# Patient Record
Sex: FEMALE | URBAN - METROPOLITAN AREA
[De-identification: names, ages, dates, MRNs, and addresses within clinical notes are randomized per-mention and may not be internally consistent; named-entity substitution may affect disease eponyms.]

---

## 2023-06-12 ENCOUNTER — APPOINTMENT (RX ONLY)
Dept: URBAN - METROPOLITAN AREA CLINIC 45 | Facility: CLINIC | Age: 38
Setting detail: DERMATOLOGY
End: 2023-06-12

## 2023-06-12 DIAGNOSIS — L24 IRRITANT CONTACT DERMATITIS: ICD-10-CM | Status: INADEQUATELY CONTROLLED

## 2023-06-12 PROBLEM — L24.9 IRRITANT CONTACT DERMATITIS, UNSPECIFIED CAUSE: Status: ACTIVE | Noted: 2023-06-12

## 2023-06-12 PROCEDURE — 99203 OFFICE O/P NEW LOW 30 MIN: CPT

## 2023-06-12 PROCEDURE — ? PRESCRIPTION

## 2023-06-12 PROCEDURE — ? PRESCRIPTION MEDICATION MANAGEMENT

## 2023-06-12 RX ORDER — RUXOLITINIB 15 MG/G
CREAM TOPICAL
Qty: 60 | Refills: 0 | Status: ACTIVE

## 2023-06-12 RX ORDER — CLOBETASOL PROPIONATE 0.5 MG/G
CREAM TOPICAL BID
Qty: 45 | Refills: 0 | Status: ERX | COMMUNITY
Start: 2023-06-12

## 2023-06-12 RX ADMIN — CLOBETASOL PROPIONATE: 0.5 CREAM TOPICAL at 00:00

## 2023-06-12 NOTE — HPI: ITCHING
What Type Of Note Output Would You Prefer (Optional)?: Bullet Format
On A Scale Of 0 To 10 How Would You Rate Your Itching?: 7
How Did Your Itching Occur?: sudden in onset (over a period of weeks to a few months)
How Severe Is Your Itching?: mild
Additional History: New patient\\n\\nStates she has hormone acne and use to use fluocinolone topical from last derm

## 2023-06-12 NOTE — PROCEDURE: PRESCRIPTION MEDICATION MANAGEMENT
Plan: Advised pt of OTC SARNA anti itch lotion , discussed laser hair removal if patient decides to stop shaving
Render In Strict Bullet Format?: No
Detail Level: Zone
Initiate Treatment: clobetasol 0.05 % topical cream BID\\nQuantity: 45.0 g  Days Supply: 30\\nSig: Apply twice daily to aa  of the rash x 2 weeks.  prn for flares\\n\\nOpzelura 1.5 % topical cream \\nQuantity: 60.0 g  Days Supply: 30\\nSig: Apply small pea size as needed